# Patient Record
Sex: FEMALE | Race: BLACK OR AFRICAN AMERICAN | NOT HISPANIC OR LATINO | URBAN - METROPOLITAN AREA
[De-identification: names, ages, dates, MRNs, and addresses within clinical notes are randomized per-mention and may not be internally consistent; named-entity substitution may affect disease eponyms.]

---

## 2023-01-12 ENCOUNTER — EMERGENCY (EMERGENCY)
Facility: HOSPITAL | Age: 68
LOS: 1 days | Discharge: HOME | End: 2023-01-12
Attending: EMERGENCY MEDICINE | Admitting: EMERGENCY MEDICINE
Payer: MEDICARE

## 2023-01-12 VITALS
OXYGEN SATURATION: 98 % | TEMPERATURE: 98 F | SYSTOLIC BLOOD PRESSURE: 122 MMHG | RESPIRATION RATE: 16 BRPM | DIASTOLIC BLOOD PRESSURE: 72 MMHG | HEART RATE: 87 BPM

## 2023-01-12 PROCEDURE — 73562 X-RAY EXAM OF KNEE 3: CPT | Mod: 26,LT

## 2023-01-12 PROCEDURE — 73552 X-RAY EXAM OF FEMUR 2/>: CPT | Mod: 26,LT

## 2023-01-12 PROCEDURE — 73590 X-RAY EXAM OF LOWER LEG: CPT | Mod: 26,LT

## 2023-01-12 PROCEDURE — 99285 EMERGENCY DEPT VISIT HI MDM: CPT

## 2023-01-12 NOTE — ED PROVIDER NOTE - CARE PROVIDER_API CALL
Miller Villalobos  Orthopedic Surgery  [address]  [place] [address]  Phone: [phone]  Fax: [phone]  Scheduled Appointment: 01/19/2023

## 2023-01-12 NOTE — ED ADULT NURSE NOTE - OBJECTIVE STATEMENT
Pt presents to ED ambulatory with steady gait c/o left knee pain x3 weesk after a trip and fall. States the incident occurred while she was abroad and had imaging that showed a patellar fxr. She was going to have surgery there but decided to come [place] the [place] due to [place]. Denies any other medical complaints.

## 2023-01-12 NOTE — ED PROVIDER NOTE - PHYSICAL EXAMINATION
Gen: NAD, non-toxic appearing  Head: normal appearing  HEENT: normal conjunctiva  Lung: no respiratory distress, speaking in full sentences     CV:   DP and PT pulses intact in the   Left lower extremity.  MSK:   There is swelling surrounding the patella.  There is no open wounds.  There is palpable crepitus of the patella.  Mild femoral condyle tenderness, medially.  No point tenderness over the tibial or fibular shaft.  Neuro:   Intact sensation over the distribution of the sural, saphenous and superficial and deep peroneal nerves.  Intact sensation over the tibial nerve.  Gross motor function of the ankle joint is intact.  Skin: No lindy rashes

## 2023-01-12 NOTE — ED ADULT TRIAGE NOTE - BP NONINVASIVE SYSTOLIC (MM HG)
What Type Of Note Output Would You Prefer (Optional)?: Standard Output
How Severe Is Your Rash?: mild
Is This A New Presentation, Or A Follow-Up?: Rash
122

## 2023-01-12 NOTE — CONSULT NOTE ADULT - SUBJECTIVE AND OBJECTIVE BOX
HPI  67yFemale c/o L knee pain s/p mechanical fall twice 3 weeks ago while in [facility]. Able to bear weight in the LLE since the fall. Denies headstrike or LOC. Denies numbness/tingling in the LLE. Denies any other trauma/injuries at this time. At baseline, community ambulator w/o assistive devices. Per pt, wearing a L hard-soled shoe (that has Velcro straps) purely for convenience because she is unable to bend down and tie her shoes while in the L knee brace. Denied multiple times that she endured any trauma/injuries/pain to the L foot/ankle.    ROS  Negative unless otherwise specified in HPI.    PAST MEDICAL & SURGICAL Hx  PAST MEDICAL & SURGICAL HISTORY:    MEDICATIONS  Home Medications    ALLERGIES  No Known Allergies      VITALS  Vital Signs Last 24 Hrs  T(C): 36.4 (12 Jan 2023 14:31), Max: 36.4 (12 Jan 2023 14:31)  T(F): 97.6 (12 Jan 2023 14:31), Max: 97.6 (12 Jan 2023 14:31)  HR: 87 (12 Jan 2023 14:31) (87 - 87)  BP: 122/72 (12 Jan 2023 14:31) (122/72 - 122/72)  RR: 16 (12 Jan 2023 14:31) (16 - 16)  SpO2: 98% (12 Jan 2023 14:31) (98% - 98%)  Parameters below as of 12 Jan 2023 14:31  Patient On (Oxygen Delivery Method): room air    PHYSICAL EXAM  Gen: Lying in bed, NAD  Resp: No increased WOB  LLE:  Skin intact, +edema over L knee  +TTP over L knee, no TTP along remainder of extremity (including foot/ankle); compartments soft  Limited ROM of knee 2/2 pain, full painless ROM of foot/ankle  Unable to SLR  Motor: TA/EHL/GS/FHL intact  Sensory: DP/SP/Tib/Tami/Saph SILT  +DP pulse, WWP    Secondary survey:  No TTP along spine or other extremities, pelvis grossly stable, SILT and compartments soft throughout    IMAGING  XRs: L patella fx (personal read)    ASSESSMENT & PLAN  67yFemale w/ L patella fx s/p immobilization.  -WBAT LLE in a BJKI  -pain control  -ice/cold compress, elevation  -no acute ortho surgery at this time  -f/u outpt with Dr. Villalobos within 1 week, call office for appt HPI  67yFemale c/o L knee pain s/p mechanical fall twice 3 weeks ago while in [facility] [place]. Able to bear weight in the LLE since the fall. Denies headstrike or LOC. Denies numbness/tingling in the LLE. Denies any other trauma/injuries at this time. At baseline, community ambulator w/o assistive devices. Per pt, wearing a L hard-soled shoe (that has Velcro straps) purely for convenience because she is unable to bend down and tie her shoes while in the L knee brace. Denied multiple times that she endured any trauma/injuries/pain to the L foot/ankle.    ROS  Negative unless otherwise specified in HPI.    PAST MEDICAL & SURGICAL Hx  PAST MEDICAL & SURGICAL HISTORY:    MEDICATIONS  Home Medications    ALLERGIES  No Known Allergies      VITALS  Vital Signs Last 24 Hrs  T(C): 36.4 (12 Jan 2023 14:31), Max: 36.4 (12 Jan 2023 14:31)  T(F): 97.6 (12 Jan 2023 14:31), Max: 97.6 (12 Jan 2023 14:31)  HR: 87 (12 Jan 2023 14:31) (87 - 87)  BP: 122/72 (12 Jan 2023 14:31) (122/72 - 122/72)  RR: 16 (12 Jan 2023 14:31) (16 - 16)  SpO2: 98% (12 Jan 2023 14:31) (98% - 98%)  Parameters below as of 12 Jan 2023 14:31  Patient On (Oxygen Delivery Method): room air    PHYSICAL EXAM  Gen: Lying in bed, NAD  Resp: No increased WOB  LLE:  Skin intact, +edema over L knee  +TTP over L knee, no TTP along remainder of extremity (including foot/ankle); compartments soft  Limited ROM of knee 2/2 pain, full painless ROM of foot/ankle  Unable to SLR  Motor: TA/EHL/GS/FHL intact  Sensory: DP/SP/Tib/Tami/Saph SILT  +DP pulse, WWP    Secondary survey:  No TTP along spine or other extremities, pelvis grossly stable, SILT and compartments soft throughout    IMAGING  XRs: L patella fx (personal read)    ASSESSMENT & PLAN  67yFemale w/ L patella fx s/p immobilization.  -WBAT LLE in a BJKI  -pain control  -ice/cold compress, elevation  -no acute ortho surgery at this time  -f/u outpt with Dr. Villalobos within 1 week, call office for appt (also discussed with Dr. Salgado trauma specialist for outpatient office visit and surgery scheduling).

## 2023-01-12 NOTE — ED ADULT NURSE NOTE - CHIEF COMPLAINT QUOTE
patient states " I slip and fell on december 26 in [facility] and broke Patella in 2 places and I was recommended surgery " patient returned yesterday and came to ER today to get treated.

## 2023-01-12 NOTE — ED PROVIDER NOTE - NSFOLLOWUPINSTRUCTIONS_ED_ALL_ED_FT
Please make an appointment and follow up with ortho in 1 week. Take 600mg of ibuprofen (Motrin, Advil) with food every 8 hours for 3 days to reduce inflammation and treat your pain.  After that, you may take as needed - please follow the directions on the packaging.  Standard regular strength Motrin or Advil is 200mg, therefore take 3-4 pills per dose. Return to the ER for worsening or persistent symptoms, and/or ANY NEW OR CONCERNING SYMPTOMS. If you have issues obtaining follow up, please call: [phone] to obtain a doctor or specialist who takes your insurance in your area.  You may call [phone] to make an appointment with the internal medicine clinic.

## 2023-01-12 NOTE — ED PROVIDER NOTE - ATTENDING CONTRIBUTION TO CARE
DR. SINHA, ATTENDING MD-  I performed a face to face bedside interview with the patient regarding history of present illness, review of symptoms and past medical history. I completed an independent physical exam.  I have discussed the patient's plan of care with the resident.   Documentation as above in the note.    66 y/o female with l knee pain.  Had patellar fx dx 3 wks ago in [facility].  Was told she would need surgery but [place] to have [place] for financial reasons.  Denies numbness tingling weakness.  Pain currently well controlled.  Obtain xr's, consult ortho to arrange for further care.

## 2023-01-12 NOTE — ED PROVIDER NOTE - PATIENT PORTAL LINK FT
You can access the FollowMyHealth Patient Portal offered by [facility] by registering at the following website: [email or web address]. By joining [facility]’s FollowMyHealth portal, you will also be able to view your health information using other applications (apps) compatible with our system.

## 2023-01-12 NOTE — ED PROVIDER NOTE - OBJECTIVE STATEMENT
67-year-old female, without no medical issues, presenting with a chief complaint of left knee pain.  Started 3 weeks ago after she tripped and fell.  She was in [facility] at the time, she was seen at a local hospital.  x-rays and MRI done there demonstrated a patellar fracture.  She was due to have surgery in [facility] until she realized that the cost would be too high for her to afford, prompting her to come to the stage for medical care.  She denies any other injuries, fall was mechanical, no associated head or neck trauma, no additional complaints at this time.  No allergies to medications.  No regular medications.

## 2023-01-12 NOTE — ED PROVIDER NOTE - NS ED ROS FT
GENERAL: no fever  EYES: no eye pain  HEENT: no neck pain  CARDIAC: no chest pain  PULMONARY: no SOB  GI: no abdominal pain  : no dysuria  SKIN: no rashes  NEURO: no headache  MSK: + L knee pain

## 2023-01-12 NOTE — ED ADULT TRIAGE NOTE - CHIEF COMPLAINT QUOTE
patient states " I slip and fell on december 26 in [facility] and broke Patella in 2 places and I was recommended surgery " patient returned yesterday and came to ER today to get treated.

## 2023-01-12 NOTE — ED PROVIDER NOTE - CLINICAL SUMMARY MEDICAL DECISION MAKING FREE TEXT BOX
67-year-old female presenting with right knee pain and confirmed outside work-up for patellar fracture, operative.  Vital signs unremarkable.  No hard signs of neurogenic or vascular injury at this time.  Will repeat plain films to assess for fracture.  Further work-up and consultation as a function of imaging findings.

## 2023-01-13 PROBLEM — Z00.00 ENCOUNTER FOR PREVENTIVE HEALTH EXAMINATION: Status: ACTIVE | Noted: 2023-01-13

## 2023-01-17 ENCOUNTER — APPOINTMENT (OUTPATIENT)
Dept: ORTHOPEDIC SURGERY | Facility: CLINIC | Age: 68
End: 2023-01-17

## 2023-01-18 ENCOUNTER — APPOINTMENT (OUTPATIENT)
Dept: ORTHOPEDIC SURGERY | Facility: CLINIC | Age: 68
End: 2023-01-18
Payer: COMMERCIAL

## 2023-01-18 DIAGNOSIS — S82.092A OTHER FRACTURE OF LEFT PATELLA, INITIAL ENCOUNTER FOR CLOSED FRACTURE: ICD-10-CM

## 2023-01-18 PROCEDURE — 99203 OFFICE O/P NEW LOW 30 MIN: CPT

## 2023-01-18 NOTE — PHYSICAL EXAM
[de-identified] : The patient is sitting comfortably in the exam room. \par LEFT leg.\par -Skin is intact, mild swelling, mild ecchymosis\par -Pain with range of motion\par -Unable to do a straight leg raise\par -Palpable defect\par -Sensation is intact L1-S1\par -5/5 EHL, FHL, TA, GS\par -Foot is warm and well-perfused, palpable dorsalis pedis pulse\par  [de-identified] : Xrays from [facility] show Comminuted fracture of the lower pole of the patella is again seen with approximately 3 cm of distraction 1/12/23.

## 2023-01-18 NOTE — DISCUSSION/SUMMARY
[de-identified] : 67-year-old with left patella fracture, approximately 5 days out.\par \par -The risks and benefits of operative versus nonoperative management were discussed at length with the patient. The patient shows a good understanding of the injury and treatment options. They would like to move forward with operative management.  I explained that the surgery will be more difficult due to the fact that the injury is approximately 3 weeks out already.\par -Weightbearing as tolerated with knee immobilizer.\par -Tylenol and NSAIDs for pain\par -We will schedule the patient for open reduction internal fixation of the left patella\par -All of the patient's questions and concerns were addressed.\par

## 2023-01-18 NOTE — HISTORY OF PRESENT ILLNESS
[de-identified] : Mrs. SEVERIN, DONNA is a 67 y.o. RHD woman who presents to the office with a left patella fracture sustained 12/26/22 in [facility] and was seen in [facility] on 1/12/23. She is here for further evaluation and discussion of treatment options. Currently taking Advil prn for pain.  She denies any medical problems and denies taking any medications.

## 2023-01-19 ENCOUNTER — OUTPATIENT (OUTPATIENT)
Dept: OUTPATIENT SERVICES | Facility: HOSPITAL | Age: 68
LOS: 1 days | End: 2023-01-19
Payer: COMMERCIAL

## 2023-01-19 VITALS
TEMPERATURE: 98 F | RESPIRATION RATE: 20 BRPM | SYSTOLIC BLOOD PRESSURE: 111 MMHG | DIASTOLIC BLOOD PRESSURE: 76 MMHG | WEIGHT: 166.89 LBS | HEIGHT: 63 IN | OXYGEN SATURATION: 99 % | HEART RATE: 82 BPM

## 2023-01-19 DIAGNOSIS — Z98.890 OTHER SPECIFIED POSTPROCEDURAL STATES: Chronic | ICD-10-CM

## 2023-01-19 DIAGNOSIS — S82.092A OTHER FRACTURE OF LEFT PATELLA, INITIAL ENCOUNTER FOR CLOSED FRACTURE: ICD-10-CM

## 2023-01-19 DIAGNOSIS — Z11.52 ENCOUNTER FOR SCREENING FOR COVID-19: ICD-10-CM

## 2023-01-19 DIAGNOSIS — Z01.818 ENCOUNTER FOR OTHER PREPROCEDURAL EXAMINATION: ICD-10-CM

## 2023-01-19 LAB
ANION GAP SERPL CALC-SCNC: 14 MMOL/L — SIGNIFICANT CHANGE UP (ref 5–17)
BUN SERPL-MCNC: 13 MG/DL — SIGNIFICANT CHANGE UP (ref 7–23)
CALCIUM SERPL-MCNC: 10.3 MG/DL — SIGNIFICANT CHANGE UP (ref 8.4–10.5)
CHLORIDE SERPL-SCNC: 105 MMOL/L — SIGNIFICANT CHANGE UP (ref 96–108)
CO2 SERPL-SCNC: 20 MMOL/L — LOW (ref 22–31)
CREAT SERPL-MCNC: 0.76 MG/DL — SIGNIFICANT CHANGE UP (ref 0.5–1.3)
EGFR: 86 ML/MIN/1.73M2 — SIGNIFICANT CHANGE UP
GLUCOSE SERPL-MCNC: 81 MG/DL — SIGNIFICANT CHANGE UP (ref 70–99)
HCT VFR BLD CALC: 41 % — SIGNIFICANT CHANGE UP (ref 34.5–45)
HGB BLD-MCNC: 12.8 G/DL — SIGNIFICANT CHANGE UP (ref 11.5–15.5)
MCHC RBC-ENTMCNC: 29 PG — SIGNIFICANT CHANGE UP (ref 27–34)
MCHC RBC-ENTMCNC: 31.2 GM/DL — LOW (ref 32–36)
MCV RBC AUTO: 93 FL — SIGNIFICANT CHANGE UP (ref 80–100)
NRBC # BLD: 0 /100 WBCS — SIGNIFICANT CHANGE UP (ref 0–0)
PLATELET # BLD AUTO: 240 K/UL — SIGNIFICANT CHANGE UP (ref 150–400)
POTASSIUM SERPL-MCNC: 5.1 MMOL/L — SIGNIFICANT CHANGE UP (ref 3.5–5.3)
POTASSIUM SERPL-SCNC: 5.1 MMOL/L — SIGNIFICANT CHANGE UP (ref 3.5–5.3)
RBC # BLD: 4.41 M/UL — SIGNIFICANT CHANGE UP (ref 3.8–5.2)
RBC # FLD: 13.7 % — SIGNIFICANT CHANGE UP (ref 10.3–14.5)
SARS-COV-2 RNA SPEC QL NAA+PROBE: SIGNIFICANT CHANGE UP
SODIUM SERPL-SCNC: 139 MMOL/L — SIGNIFICANT CHANGE UP (ref 135–145)
WBC # BLD: 3.99 K/UL — SIGNIFICANT CHANGE UP (ref 3.8–10.5)
WBC # FLD AUTO: 3.99 K/UL — SIGNIFICANT CHANGE UP (ref 3.8–10.5)

## 2023-01-19 PROCEDURE — 85027 COMPLETE CBC AUTOMATED: CPT

## 2023-01-19 PROCEDURE — G0463: CPT

## 2023-01-19 PROCEDURE — 80048 BASIC METABOLIC PNL TOTAL CA: CPT

## 2023-01-19 RX ORDER — LIDOCAINE HCL 20 MG/ML
0.2 VIAL (ML) INJECTION ONCE
Refills: 0 | Status: DISCONTINUED | OUTPATIENT
Start: 2023-01-23 | End: 2023-01-23

## 2023-01-19 RX ORDER — CHLORHEXIDINE GLUCONATE 213 G/1000ML
1 SOLUTION TOPICAL ONCE
Refills: 0 | Status: DISCONTINUED | OUTPATIENT
Start: 2023-01-23 | End: 2023-01-23

## 2023-01-19 RX ORDER — SODIUM CHLORIDE 9 MG/ML
3 INJECTION INTRAMUSCULAR; INTRAVENOUS; SUBCUTANEOUS EVERY 8 HOURS
Refills: 0 | Status: DISCONTINUED | OUTPATIENT
Start: 2023-01-23 | End: 2023-01-23

## 2023-01-19 NOTE — H&P PST ADULT - HEIGHT IN INCHES
Rapid Response Team Follow-up Note    Followed up with patient for proactive rounding.   Continues to have high fevers, use of PRN Tylenol done.  Reviewed plan of care with primary RN  Please call Rapid Response RN with any questions or concerns at  [phone].   3

## 2023-01-19 NOTE — H&P PST ADULT - ASSESSMENT
DASI score: 6  DASI activity: usually active, no formal exercise, limited now because of leg brace  Loose teeth or denture:none

## 2023-01-19 NOTE — H&P PST ADULT - ATTENDING COMMENTS
Associated images, notes, and labs were reviewed. The patient was seen and examined. Risks and benefits of operative versus nonoperative management were discussed with the patient. The patient showed a good understanding of the injury and the treatment options. They would like to move forward with operative management of patella fracture.

## 2023-01-19 NOTE — H&P PST ADULT - HISTORY OF PRESENT ILLNESS
68 yo female with fracture of left patella in [facility] 12/22, upon return patient seen in ER, diagnosed with patella fracture, now for repair 66 yo female with fracture of left patella in [facility] 12/22, upon return patient seen in ER, diagnosed with patella fracture, now for repair. Wearing immobilizer at visit.  pain 4/10, able to sleep without pain    Denies covid or covid exposure  covid swab 1/19 [place]

## 2023-01-22 ENCOUNTER — TRANSCRIPTION ENCOUNTER (OUTPATIENT)
Age: 68
End: 2023-01-22

## 2023-01-23 ENCOUNTER — TRANSCRIPTION ENCOUNTER (OUTPATIENT)
Age: 68
End: 2023-01-23

## 2023-01-23 ENCOUNTER — OUTPATIENT (OUTPATIENT)
Dept: OUTPATIENT SERVICES | Facility: HOSPITAL | Age: 68
LOS: 1 days | End: 2023-01-23
Payer: COMMERCIAL

## 2023-01-23 ENCOUNTER — APPOINTMENT (OUTPATIENT)
Dept: ORTHOPEDIC SURGERY | Facility: HOSPITAL | Age: 68
End: 2023-01-23

## 2023-01-23 VITALS
HEIGHT: 63 IN | HEART RATE: 76 BPM | SYSTOLIC BLOOD PRESSURE: 128 MMHG | OXYGEN SATURATION: 96 % | DIASTOLIC BLOOD PRESSURE: 76 MMHG | TEMPERATURE: 98 F | RESPIRATION RATE: 18 BRPM | WEIGHT: 166.89 LBS

## 2023-01-23 DIAGNOSIS — Z98.890 OTHER SPECIFIED POSTPROCEDURAL STATES: Chronic | ICD-10-CM

## 2023-01-23 DIAGNOSIS — S82.092A OTHER FRACTURE OF LEFT PATELLA, INITIAL ENCOUNTER FOR CLOSED FRACTURE: ICD-10-CM

## 2023-01-23 PROCEDURE — 76000 FLUOROSCOPY <1 HR PHYS/QHP: CPT

## 2023-01-23 PROCEDURE — C9803: CPT

## 2023-01-23 PROCEDURE — 27524 TREAT KNEECAP FRACTURE: CPT | Mod: LT

## 2023-01-23 PROCEDURE — U0005: CPT

## 2023-01-23 PROCEDURE — U0003: CPT

## 2023-01-23 DEVICE — SCREW LOKG 2.7X16MM: Type: IMPLANTABLE DEVICE | Site: LEFT | Status: FUNCTIONAL

## 2023-01-23 DEVICE — GUIDEWIRE UNTHREADED 1.4X150MM: Type: IMPLANTABLE DEVICE | Site: LEFT | Status: FUNCTIONAL

## 2023-01-23 DEVICE — SCREW LOKG 2.7 X 18: Type: IMPLANTABLE DEVICE | Site: LEFT | Status: FUNCTIONAL

## 2023-01-23 DEVICE — PLATE T VARIAX NRRW LOKG 6H 2.7X47MM: Type: IMPLANTABLE DEVICE | Site: LEFT | Status: FUNCTIONAL

## 2023-01-23 DEVICE — WASHER TI 4MM: Type: IMPLANTABLE DEVICE | Site: LEFT | Status: FUNCTIONAL

## 2023-01-23 DEVICE — SCREW NON LOCKING 2.7X16MM: Type: IMPLANTABLE DEVICE | Site: LEFT | Status: FUNCTIONAL

## 2023-01-23 DEVICE — SCREW LOKG 2.7X12MM: Type: IMPLANTABLE DEVICE | Site: LEFT | Status: FUNCTIONAL

## 2023-01-23 DEVICE — SCREW CANN TI 4X34MM: Type: IMPLANTABLE DEVICE | Site: LEFT | Status: FUNCTIONAL

## 2023-01-23 DEVICE — PLATE VARIAX NRRW LOKG 6H 2.4X41MM: Type: IMPLANTABLE DEVICE | Site: LEFT | Status: FUNCTIONAL

## 2023-01-23 DEVICE — SCREW BONE 2.7X40MM: Type: IMPLANTABLE DEVICE | Site: LEFT | Status: FUNCTIONAL

## 2023-01-23 RX ORDER — ACETAMINOPHEN 500 MG
1000 TABLET ORAL ONCE
Refills: 0 | Status: COMPLETED | OUTPATIENT
Start: 2023-01-23 | End: 2023-01-23

## 2023-01-23 RX ORDER — SODIUM CHLORIDE 9 MG/ML
500 INJECTION INTRAMUSCULAR; INTRAVENOUS; SUBCUTANEOUS ONCE
Refills: 0 | Status: COMPLETED | OUTPATIENT
Start: 2023-01-24 | End: 2023-01-24

## 2023-01-23 RX ORDER — OXYCODONE HYDROCHLORIDE 5 MG/1
10 TABLET ORAL EVERY 4 HOURS
Refills: 0 | Status: DISCONTINUED | OUTPATIENT
Start: 2023-01-23 | End: 2023-01-24

## 2023-01-23 RX ORDER — OXYCODONE HYDROCHLORIDE 5 MG/1
1 TABLET ORAL
Qty: 15 | Refills: 0
Start: 2023-01-23 | End: 2023-01-27

## 2023-01-23 RX ORDER — ASPIRIN/CALCIUM CARB/MAGNESIUM 324 MG
325 TABLET ORAL EVERY 12 HOURS
Refills: 0 | Status: DISCONTINUED | OUTPATIENT
Start: 2023-01-23 | End: 2023-02-06

## 2023-01-23 RX ORDER — ACETAMINOPHEN 500 MG
975 TABLET ORAL EVERY 8 HOURS
Refills: 0 | Status: DISCONTINUED | OUTPATIENT
Start: 2023-01-24 | End: 2023-02-06

## 2023-01-23 RX ORDER — FAMOTIDINE 10 MG/ML
20 INJECTION INTRAVENOUS
Refills: 0 | Status: DISCONTINUED | OUTPATIENT
Start: 2023-01-23 | End: 2023-02-06

## 2023-01-23 RX ORDER — SODIUM CHLORIDE 9 MG/ML
1000 INJECTION INTRAMUSCULAR; INTRAVENOUS; SUBCUTANEOUS
Refills: 0 | Status: DISCONTINUED | OUTPATIENT
Start: 2023-01-23 | End: 2023-02-06

## 2023-01-23 RX ORDER — OXYCODONE HYDROCHLORIDE 5 MG/1
5 TABLET ORAL ONCE
Refills: 0 | Status: DISCONTINUED | OUTPATIENT
Start: 2023-01-23 | End: 2023-01-23

## 2023-01-23 RX ORDER — FAMOTIDINE 10 MG/ML
20 INJECTION INTRAVENOUS ONCE
Refills: 0 | Status: COMPLETED | OUTPATIENT
Start: 2023-01-23 | End: 2023-01-23

## 2023-01-23 RX ORDER — HYDROMORPHONE HYDROCHLORIDE 2 MG/ML
0.5 INJECTION INTRAMUSCULAR; INTRAVENOUS; SUBCUTANEOUS
Refills: 0 | Status: DISCONTINUED | OUTPATIENT
Start: 2023-01-23 | End: 2023-01-24

## 2023-01-23 RX ORDER — DIPHENHYDRAMINE HCL 50 MG
12.5 CAPSULE ORAL ONCE
Refills: 0 | Status: COMPLETED | OUTPATIENT
Start: 2023-01-23 | End: 2023-01-23

## 2023-01-23 RX ORDER — CEFAZOLIN SODIUM 1 G
2000 VIAL (EA) INJECTION EVERY 6 HOURS
Refills: 0 | Status: COMPLETED | OUTPATIENT
Start: 2023-01-23 | End: 2023-01-24

## 2023-01-23 RX ORDER — ASPIRIN/CALCIUM CARB/MAGNESIUM 324 MG
1 TABLET ORAL
Qty: 60 | Refills: 0
Start: 2023-01-23 | End: 2023-02-21

## 2023-01-23 RX ORDER — DIPHENHYDRAMINE HCL 50 MG
12.5 CAPSULE ORAL ONCE
Refills: 0 | Status: DISCONTINUED | OUTPATIENT
Start: 2023-01-23 | End: 2023-01-23

## 2023-01-23 RX ORDER — ONDANSETRON 8 MG/1
4 TABLET, FILM COATED ORAL ONCE
Refills: 0 | Status: COMPLETED | OUTPATIENT
Start: 2023-01-23 | End: 2023-01-23

## 2023-01-23 RX ORDER — CEFAZOLIN SODIUM 1 G
2000 VIAL (EA) INJECTION ONCE
Refills: 0 | Status: COMPLETED | OUTPATIENT
Start: 2023-01-24 | End: 2023-01-24

## 2023-01-23 RX ORDER — CEFAZOLIN SODIUM 1 G
2000 VIAL (EA) INJECTION EVERY 8 HOURS
Refills: 0 | Status: DISCONTINUED | OUTPATIENT
Start: 2023-01-23 | End: 2023-01-23

## 2023-01-23 RX ORDER — FENTANYL CITRATE 50 UG/ML
50 INJECTION INTRAVENOUS
Refills: 0 | Status: DISCONTINUED | OUTPATIENT
Start: 2023-01-23 | End: 2023-01-24

## 2023-01-23 RX ORDER — ONDANSETRON 8 MG/1
4 TABLET, FILM COATED ORAL EVERY 6 HOURS
Refills: 0 | Status: DISCONTINUED | OUTPATIENT
Start: 2023-01-23 | End: 2023-02-06

## 2023-01-23 RX ORDER — ACETAMINOPHEN 500 MG
3 TABLET ORAL
Qty: 0 | Refills: 0 | DISCHARGE
Start: 2023-01-23

## 2023-01-23 RX ORDER — CEFAZOLIN SODIUM 1 G
2000 VIAL (EA) INJECTION ONCE
Refills: 0 | Status: COMPLETED | OUTPATIENT
Start: 2023-01-23 | End: 2023-01-23

## 2023-01-23 RX ORDER — LANOLIN ALCOHOL/MO/W.PET/CERES
5 CREAM (GRAM) TOPICAL AT BEDTIME
Refills: 0 | Status: DISCONTINUED | OUTPATIENT
Start: 2023-01-23 | End: 2023-02-06

## 2023-01-23 RX ORDER — FENTANYL CITRATE 50 UG/ML
25 INJECTION INTRAVENOUS
Refills: 0 | Status: DISCONTINUED | OUTPATIENT
Start: 2023-01-23 | End: 2023-01-24

## 2023-01-23 RX ORDER — HYDROMORPHONE HYDROCHLORIDE 2 MG/ML
0.5 INJECTION INTRAMUSCULAR; INTRAVENOUS; SUBCUTANEOUS ONCE
Refills: 0 | Status: DISCONTINUED | OUTPATIENT
Start: 2023-01-23 | End: 2023-01-23

## 2023-01-23 RX ORDER — SENNA PLUS 8.6 MG/1
2 TABLET ORAL AT BEDTIME
Refills: 0 | Status: DISCONTINUED | OUTPATIENT
Start: 2023-01-23 | End: 2023-02-06

## 2023-01-23 RX ORDER — OXYCODONE HYDROCHLORIDE 5 MG/1
5 TABLET ORAL EVERY 6 HOURS
Refills: 0 | Status: DISCONTINUED | OUTPATIENT
Start: 2023-01-23 | End: 2023-01-23

## 2023-01-23 RX ORDER — KETOROLAC TROMETHAMINE 30 MG/ML
15 SYRINGE (ML) INJECTION ONCE
Refills: 0 | Status: DISCONTINUED | OUTPATIENT
Start: 2023-01-23 | End: 2023-01-23

## 2023-01-23 RX ORDER — ONDANSETRON 8 MG/1
4 TABLET, FILM COATED ORAL ONCE
Refills: 0 | Status: DISCONTINUED | OUTPATIENT
Start: 2023-01-23 | End: 2023-01-24

## 2023-01-23 RX ORDER — OXYCODONE HYDROCHLORIDE 5 MG/1
5 TABLET ORAL EVERY 4 HOURS
Refills: 0 | Status: DISCONTINUED | OUTPATIENT
Start: 2023-01-23 | End: 2023-01-23

## 2023-01-23 RX ORDER — SENNA PLUS 8.6 MG/1
2 TABLET ORAL
Qty: 0 | Refills: 0 | DISCHARGE
Start: 2023-01-23

## 2023-01-23 RX ADMIN — SODIUM CHLORIDE 100 MILLILITER(S): 9 INJECTION INTRAMUSCULAR; INTRAVENOUS; SUBCUTANEOUS at 22:19

## 2023-01-23 RX ADMIN — HYDROMORPHONE HYDROCHLORIDE 0.5 MILLIGRAM(S): 2 INJECTION INTRAMUSCULAR; INTRAVENOUS; SUBCUTANEOUS at 17:45

## 2023-01-23 RX ADMIN — OXYCODONE HYDROCHLORIDE 10 MILLIGRAM(S): 5 TABLET ORAL at 23:02

## 2023-01-23 RX ADMIN — HYDROMORPHONE HYDROCHLORIDE 0.5 MILLIGRAM(S): 2 INJECTION INTRAMUSCULAR; INTRAVENOUS; SUBCUTANEOUS at 16:56

## 2023-01-23 RX ADMIN — Medication 100 MILLIGRAM(S): at 20:31

## 2023-01-23 RX ADMIN — SODIUM CHLORIDE 100 MILLILITER(S): 9 INJECTION INTRAMUSCULAR; INTRAVENOUS; SUBCUTANEOUS at 20:31

## 2023-01-23 RX ADMIN — Medication 325 MILLIGRAM(S): at 22:17

## 2023-01-23 RX ADMIN — Medication 15 MILLIGRAM(S): at 23:48

## 2023-01-23 RX ADMIN — HYDROMORPHONE HYDROCHLORIDE 0.5 MILLIGRAM(S): 2 INJECTION INTRAMUSCULAR; INTRAVENOUS; SUBCUTANEOUS at 17:34

## 2023-01-23 RX ADMIN — ONDANSETRON 4 MILLIGRAM(S): 8 TABLET, FILM COATED ORAL at 18:00

## 2023-01-23 RX ADMIN — Medication 1000 MILLIGRAM(S): at 22:00

## 2023-01-23 RX ADMIN — Medication 400 MILLIGRAM(S): at 21:03

## 2023-01-23 RX ADMIN — Medication 12.5 MILLIGRAM(S): at 19:30

## 2023-01-23 RX ADMIN — OXYCODONE HYDROCHLORIDE 5 MILLIGRAM(S): 5 TABLET ORAL at 18:15

## 2023-01-23 RX ADMIN — OXYCODONE HYDROCHLORIDE 10 MILLIGRAM(S): 5 TABLET ORAL at 22:18

## 2023-01-23 RX ADMIN — FAMOTIDINE 20 MILLIGRAM(S): 10 INJECTION INTRAVENOUS at 19:24

## 2023-01-23 RX ADMIN — OXYCODONE HYDROCHLORIDE 5 MILLIGRAM(S): 5 TABLET ORAL at 17:45

## 2023-01-23 NOTE — PHYSICAL THERAPY INITIAL EVALUATION ADULT - PERTINENT HX OF CURRENT PROBLEM, REHAB EVAL
66 yo female with fracture of left patella in [facility] 12/22, upon return patient seen in ER, diagnosed with patella fracture, now for repair. Wearing immobilizer at visit. 66 yo female with fracture of left patella in [facility] 12/22, upon return patient seen in ER, diagnosed with patella fracture, now for repair. Wearing immobilizer at visit. Now s/p L patella ORIF 1/23.

## 2023-01-23 NOTE — ASU DISCHARGE PLAN (ADULT/PEDIATRIC) - CARE PROVIDER_API CALL
Jovan Salgado)  Orthopedics  [address] [place] [address]  Phone: [phone]  Fax: [phone]  Follow Up Time:

## 2023-01-23 NOTE — PRE-ANESTHESIA EVALUATION ADULT - BP NONINVASIVE SYSTOLIC (MM HG)
Partially impaired: cannot see medication labels or newsprint, but can see obstacles in path, and the surrounding layout; can count fingers at arm's length 128

## 2023-01-23 NOTE — PHYSICAL THERAPY INITIAL EVALUATION ADULT - GAIT DEVIATIONS NOTED, PT EVAL
decreased jonny/increased time in double stance/decreased step length/decreased stride length/decreased weight-shifting ability

## 2023-01-23 NOTE — PHYSICAL THERAPY INITIAL EVALUATION ADULT - GENERAL OBSERVATIONS, REHAB EVAL
Pt rec'd semisupine in bed, c/o 4/10 L knee pain, and c/o of nauseousness, +IVL, +PACU monitoring. Agreeable to attempt PT. RN cleared pt to be seen and present t/o.

## 2023-01-23 NOTE — ASU PREOP CHECKLIST - HAND OFF
A/P: 36yo  POD#1 s/p rLTCS and BLS. Patient is stable and doing well post-operatively. Unit RN to OR RN

## 2023-01-23 NOTE — PHYSICAL THERAPY INITIAL EVALUATION ADULT - ADDITIONAL COMMENTS
Per pt, she will be staying at her niece's house upon d/c. Reports he cousin will be staying with pt to assist as needed. Reports niece's house has 13 steps to enter (+HR) and 6 steps inside (+HR) to bedroom. Pt reports she was independent with functional mobility using crutches since fall in December. However, prior to fall, pt was independent without AD.

## 2023-01-23 NOTE — PHYSICAL THERAPY INITIAL EVALUATION ADULT - ACTIVE RANGE OF MOTION EXAMINATION, REHAB EVAL
L ankle and hip AAROM/PROM WLF (unable to L SLR 2/2 pain)/bilateral upper extremity Active ROM was WFL (within functional limits)/Right LE Active ROM was WFL (within functional limits)

## 2023-01-23 NOTE — ASU DISCHARGE PLAN (ADULT/PEDIATRIC) - ASU DC SPECIAL INSTRUCTIONSFT
Follow up with Dr Perales 10-14 days. Call office for appointment. Take medications as prescribed. Keep dressing clean, dry, and intact. Rest, ice, and elevate affected extremity.    Keep leg in extension at all times

## 2023-01-24 VITALS
RESPIRATION RATE: 15 BRPM | SYSTOLIC BLOOD PRESSURE: 100 MMHG | OXYGEN SATURATION: 100 % | DIASTOLIC BLOOD PRESSURE: 59 MMHG | HEART RATE: 60 BPM

## 2023-01-24 LAB
ANION GAP SERPL CALC-SCNC: 12 MMOL/L — SIGNIFICANT CHANGE UP (ref 5–17)
BUN SERPL-MCNC: 15 MG/DL — SIGNIFICANT CHANGE UP (ref 7–23)
CALCIUM SERPL-MCNC: 9 MG/DL — SIGNIFICANT CHANGE UP (ref 8.4–10.5)
CHLORIDE SERPL-SCNC: 104 MMOL/L — SIGNIFICANT CHANGE UP (ref 96–108)
CO2 SERPL-SCNC: 22 MMOL/L — SIGNIFICANT CHANGE UP (ref 22–31)
CREAT SERPL-MCNC: 0.69 MG/DL — SIGNIFICANT CHANGE UP (ref 0.5–1.3)
EGFR: 96 ML/MIN/1.73M2 — SIGNIFICANT CHANGE UP
GLUCOSE SERPL-MCNC: 190 MG/DL — HIGH (ref 70–99)
HCT VFR BLD CALC: 35.2 % — SIGNIFICANT CHANGE UP (ref 34.5–45)
HGB BLD-MCNC: 10.9 G/DL — LOW (ref 11.5–15.5)
MCHC RBC-ENTMCNC: 28.8 PG — SIGNIFICANT CHANGE UP (ref 27–34)
MCHC RBC-ENTMCNC: 31 GM/DL — LOW (ref 32–36)
MCV RBC AUTO: 92.9 FL — SIGNIFICANT CHANGE UP (ref 80–100)
NRBC # BLD: 0 /100 WBCS — SIGNIFICANT CHANGE UP (ref 0–0)
PLATELET # BLD AUTO: 238 K/UL — SIGNIFICANT CHANGE UP (ref 150–400)
POTASSIUM SERPL-MCNC: 4.6 MMOL/L — SIGNIFICANT CHANGE UP (ref 3.5–5.3)
POTASSIUM SERPL-SCNC: 4.6 MMOL/L — SIGNIFICANT CHANGE UP (ref 3.5–5.3)
RBC # BLD: 3.79 M/UL — LOW (ref 3.8–5.2)
RBC # FLD: 13.6 % — SIGNIFICANT CHANGE UP (ref 10.3–14.5)
SODIUM SERPL-SCNC: 138 MMOL/L — SIGNIFICANT CHANGE UP (ref 135–145)
WBC # BLD: 5.6 K/UL — SIGNIFICANT CHANGE UP (ref 3.8–10.5)
WBC # FLD AUTO: 5.6 K/UL — SIGNIFICANT CHANGE UP (ref 3.8–10.5)

## 2023-01-24 PROCEDURE — C1889: CPT

## 2023-01-24 PROCEDURE — 80048 BASIC METABOLIC PNL TOTAL CA: CPT

## 2023-01-24 PROCEDURE — C1713: CPT

## 2023-01-24 PROCEDURE — 97530 THERAPEUTIC ACTIVITIES: CPT

## 2023-01-24 PROCEDURE — 97116 GAIT TRAINING THERAPY: CPT

## 2023-01-24 PROCEDURE — 76000 FLUOROSCOPY <1 HR PHYS/QHP: CPT

## 2023-01-24 PROCEDURE — 97161 PT EVAL LOW COMPLEX 20 MIN: CPT

## 2023-01-24 PROCEDURE — 27524 TREAT KNEECAP FRACTURE: CPT | Mod: LT

## 2023-01-24 PROCEDURE — C1769: CPT

## 2023-01-24 PROCEDURE — C9399: CPT

## 2023-01-24 PROCEDURE — 85027 COMPLETE CBC AUTOMATED: CPT

## 2023-01-24 RX ADMIN — SODIUM CHLORIDE 100 MILLILITER(S): 9 INJECTION INTRAMUSCULAR; INTRAVENOUS; SUBCUTANEOUS at 06:44

## 2023-01-24 RX ADMIN — Medication 15 MILLIGRAM(S): at 00:19

## 2023-01-24 RX ADMIN — SODIUM CHLORIDE 100 MILLILITER(S): 9 INJECTION INTRAMUSCULAR; INTRAVENOUS; SUBCUTANEOUS at 03:20

## 2023-01-24 RX ADMIN — SODIUM CHLORIDE 1000 MILLILITER(S): 9 INJECTION INTRAMUSCULAR; INTRAVENOUS; SUBCUTANEOUS at 06:45

## 2023-01-24 RX ADMIN — OXYCODONE HYDROCHLORIDE 10 MILLIGRAM(S): 5 TABLET ORAL at 03:22

## 2023-01-24 RX ADMIN — Medication 100 MILLIGRAM(S): at 03:17

## 2023-01-24 RX ADMIN — FAMOTIDINE 20 MILLIGRAM(S): 10 INJECTION INTRAVENOUS at 07:53

## 2023-01-24 RX ADMIN — OXYCODONE HYDROCHLORIDE 10 MILLIGRAM(S): 5 TABLET ORAL at 04:00

## 2023-01-24 NOTE — PROGRESS NOTE ADULT - SUBJECTIVE AND OBJECTIVE BOX
Orthopaedic Surgery Progress Note    Subjective:   Patient seen and examined. No acute events overnight. Nausea resolved, has ambulated to bathroom, eager to work with PT and go home    Objective:  T(C): 36.1 (01-24-23 @ 03:00), Max: 36.6 (01-23-23 @ 10:11)  HR: 61 (01-24-23 @ 04:00) (61 - 93)  BP: 99/53 (01-24-23 @ 04:00) (94/55 - 138/73)  RR: 17 (01-24-23 @ 04:00) (14 - 18)  SpO2: 100% (01-24-23 @ 04:00) (94% - 100%)  Wt(kg): --    01-23 @ 07:01  -  01-24 @ 05:53  --------------------------------------------------------  IN: 820 mL / OUT: 400 mL / NET: 420 mL        PE    NAD  LLE:   dressing C/D/I, KI in place  motor intact GS/TA/EHL  SILT S/S/SP/DP  WWP                          10.9   5.60  )-----------( 238      ( 24 Jan 2023 03:18 )             35.2         67y Female s/p ORIF L patella 1/23  - Pain control  - WBAT in KI  - PT/OT/OOB  - DVT ppx:  BID  - Dispo planning: home after works with PT this AM

## 2023-02-08 ENCOUNTER — APPOINTMENT (OUTPATIENT)
Dept: ORTHOPEDIC SURGERY | Facility: CLINIC | Age: 68
End: 2023-02-08
Payer: COMMERCIAL

## 2023-02-08 VITALS — HEIGHT: 63 IN | WEIGHT: 168 LBS | BODY MASS INDEX: 29.77 KG/M2

## 2023-02-08 PROCEDURE — 99024 POSTOP FOLLOW-UP VISIT: CPT

## 2023-02-09 NOTE — HISTORY OF PRESENT ILLNESS
[Chills] : no chills [Constipation] : no constipation [Diarrhea] : no diarrhea [Dysuria] : no dysuria [Fever] : no fever [Nausea] : no nausea [Vomiting] : no vomiting [de-identified] : s/p ORIF left patella, DOS: 1/23/23 [de-identified] : Mrs. SEVERIN, DONNA is a 67 y.o. woman who presents to the office for post op s/p ORIF left patella on 1/23/23. Since her surgery she states she is feeling good.  Mild pain across the incision site. She is in a knee immobilizer.  Ambulating with bilateral axillary crutches.   [de-identified] : The patient is sitting comfortably in the exam room. \par LEFT leg.\par -Skin is intact, no swelling, no ecchymosis\par -Incision is clean and dry, no erythema, no signs of infection\par -Range of motion knee 0 to 30 degrees\par -Sensation is intact L1-S1\par -5/5 EHL, FHL, TA, GS\par -Foot is warm and well-perfused, palpable dorsalis pedis pulse\par  [de-identified] : No Xrays were taken in the office today, 2/8/23. [de-identified] : 67-year-old woman s/p ORIF left patella, approximately 2 weeks out. [de-identified] : -Weightbearing as tolerated left LE\par -Jaquan brace provided locked in extension,allow 30 degrees flexion in 2 weeks: 2/22/23\par -Physical therapy: to improve ROM, prevent flexion for 2 more weeks: 2/22/23\par -Home exercises.  These were demonstrated in the office today.\par -Follow-up in 1 month with x-rays of the left knee at that time\par -All the patient's questions and concerns were addressed during this visit\par

## 2023-03-08 ENCOUNTER — APPOINTMENT (OUTPATIENT)
Dept: ORTHOPEDIC SURGERY | Facility: CLINIC | Age: 68
End: 2023-03-08
Payer: COMMERCIAL

## 2023-03-08 PROBLEM — Z78.9 OTHER SPECIFIED HEALTH STATUS: Chronic | Status: ACTIVE | Noted: 2023-01-19

## 2023-03-08 PROCEDURE — 99024 POSTOP FOLLOW-UP VISIT: CPT

## 2023-03-08 PROCEDURE — 73562 X-RAY EXAM OF KNEE 3: CPT | Mod: LT

## 2023-04-20 NOTE — HISTORY OF PRESENT ILLNESS
[Chills] : no chills [Constipation] : no constipation [Diarrhea] : no diarrhea [Dysuria] : no dysuria [Fever] : no fever [Nausea] : no nausea [Vomiting] : no vomiting [de-identified] : s/p ORIF left patella, DOS: 1/23/23 [de-identified] : Mrs. SEVERIN, DONNA is a 67 y.o. woman who presents to the office for post op s/p ORIF left patella on 1/23/23. Since her last visit she states she is feeling better. She presents in a Jaquan brace. She is participating in PT 2 x week. She has some stiffness at night. She does not take any medication for pain.  [de-identified] : The patient is sitting comfortably in the exam room. \par LEFT leg.\par -Skin is intact, no swelling, no ecchymosis\par -Incision is well-healed, no erythema, no signs of infection\par -Range of motion knee 0 to 90 degrees\par -Sensation is intact L1-S1\par -5/5 EHL, FHL, TA, GS\par -Foot is warm and well-perfused, palpable dorsalis pedis pulse\par  [de-identified] : Xrays of the left knee were taken in the office today, 3/8/23.  AP, oblique, lateral.  X-rays show good overall alignment of the knee.  The patella is in excellent position.  No displacement of the implants.  No lucencies around the screws. [de-identified] : 67-year-old woman s/p ORIF left patella, approximately 6.5 weeks out. [de-identified] : -Weightbearing as tolerated left LE\par -No restriction on range of motion\par -Discontinue use of [place]\par -Continue Physical therapy: to improve ROM and strengthening\par -Follow-up in 2 months with x-rays of the left knee at that time\par -All the patient's questions and concerns were addressed during this visit\par

## 2023-05-24 ENCOUNTER — APPOINTMENT (OUTPATIENT)
Dept: ORTHOPEDIC SURGERY | Facility: CLINIC | Age: 68
End: 2023-05-24
Payer: COMMERCIAL

## 2023-05-24 PROCEDURE — 99213 OFFICE O/P EST LOW 20 MIN: CPT

## 2023-05-24 PROCEDURE — 73562 X-RAY EXAM OF KNEE 3: CPT | Mod: LT

## 2023-05-24 NOTE — DISCUSSION/SUMMARY
[de-identified] : 68-year-old woman s/p ORIF left patella, approximately 4 months out.\par -X-ray and physical exam findings were discussed with the patient\par -Weightbearing as tolerated left LE\par -Continue Physical therapy: to improve ROM and strengthening\par -Follow-up in 4 months with x-rays of the left knee at that time\par -All the patient's questions and concerns were addressed during this visit\par

## 2023-05-24 NOTE — HISTORY OF PRESENT ILLNESS
[de-identified] : Mrs. SEVERIN, DONNA is a 68 y.o. woman who presents to the office for post op s/p ORIF left patella on 1/23/23. Since her last visit she states she is feeling better. She completed PT and has been doing home exercises to work on ROM. She does not require the use of pain medication.

## 2023-05-24 NOTE — PHYSICAL EXAM
[de-identified] : The patient is sitting comfortably in the exam room. \par LEFT leg.\par -Skin is intact, no swelling, no ecchymosis\par -Incision is well-healed, no erythema, no signs of infection\par -Range of motion knee 0 to 120 degrees\par -Sensation is intact L1-S1\par -5/5 EHL, FHL, TA, GS\par -Foot is warm and well-perfused, palpable dorsalis pedis pulse\par  [de-identified] : Xrays of the left knee were taken in the office today, 5/24/23.  AP, oblique, lateral.  X-rays show good overall alignment of the knee.  The patella is in excellent position.  No displacement of the implants.  No lucencies around the screws.

## 2023-10-18 ENCOUNTER — APPOINTMENT (OUTPATIENT)
Dept: ORTHOPEDIC SURGERY | Facility: CLINIC | Age: 68
End: 2023-10-18
Payer: COMMERCIAL

## 2023-10-18 VITALS — BODY MASS INDEX: 29.23 KG/M2 | WEIGHT: 165 LBS | HEIGHT: 63 IN

## 2023-10-18 DIAGNOSIS — S82.002A UNSPECIFIED FRACTURE OF LEFT PATELLA, INITIAL ENCOUNTER FOR CLOSED FRACTURE: ICD-10-CM

## 2023-10-18 PROCEDURE — 73562 X-RAY EXAM OF KNEE 3: CPT | Mod: LT

## 2023-10-18 PROCEDURE — 99213 OFFICE O/P EST LOW 20 MIN: CPT

## 2024-06-11 NOTE — ASU PATIENT PROFILE, ADULT - ACCEPTABLE
Patient notified, she will call insurance to find out who is in net work then let me know so I can send order   0

## (undated) DEVICE — DRILL BIT STRYKER ORTHO TRAUMA AO SCALED 2X105MM

## (undated) DEVICE — BLADE SCALPEL SAFETYLOCK #10

## (undated) DEVICE — MEDICATION LABELS W MARKER

## (undated) DEVICE — GLV 7 PROTEXIS (WHITE)

## (undated) DEVICE — GLV 7.5 PROTEXIS (WHITE)

## (undated) DEVICE — DRILL BIT STRYKER ORTHO 2.7MM

## (undated) DEVICE — DRAPE TOWEL BLUE 17" X 24"

## (undated) DEVICE — POSITIONER CARDIAC BUMP

## (undated) DEVICE — MARKING PEN W RULER

## (undated) DEVICE — DRAPE C ARM C-ARMOUR

## (undated) DEVICE — SOL IRR POUR H2O 250ML

## (undated) DEVICE — SUT ETHIBOND 2 30" LR

## (undated) DEVICE — SUT POLYSORB 2-0 30" GS-21 UNDYED

## (undated) DEVICE — LAP PAD 18 X 18"

## (undated) DEVICE — DRSG WEBRIL 6"

## (undated) DEVICE — SUT POLYSORB 0 30" GS-21 UNDYED

## (undated) DEVICE — SUT FIBERWIRE #2 38" STRAND 1 BLUE T-5 TAPER

## (undated) DEVICE — SOL IRR POUR NS 0.9% 500ML

## (undated) DEVICE — DRAPE C ARM UNIVERSAL

## (undated) DEVICE — POSITIONER FOAM EGG CRATE ULNAR 2PCS (PINK)

## (undated) DEVICE — SPLINT IMMOBILIZER 3-PANEL KNEE 20"

## (undated) DEVICE — DRSG STERISTRIPS 0.5 X 4"

## (undated) DEVICE — SUT RETRIEVER S&N LAVENDER

## (undated) DEVICE — GLV 6.5 PROTEXIS (WHITE)

## (undated) DEVICE — DRAPE MAYO STAND 30"

## (undated) DEVICE — GLV 8.5 PROTEXIS (WHITE)

## (undated) DEVICE — SPECIMEN CONTAINER 100ML

## (undated) DEVICE — GOWN TRIMAX LG

## (undated) DEVICE — PACK EXTREMITY

## (undated) DEVICE — DRSG AQUACEL 3.5 X 10"

## (undated) DEVICE — DRSG KLING 6"

## (undated) DEVICE — DRSG STOCKINETTE IMPERVIOUS LG

## (undated) DEVICE — FOLEY TRAY 16FR 5CC LTX UMETER CLOSED

## (undated) DEVICE — SUT POLYSORB 3-0 30" P-12 UNDYED

## (undated) DEVICE — DRAPE IOBAN 23" X 23"

## (undated) DEVICE — SUT BIOSYN 3-0 27" P-12

## (undated) DEVICE — VENODYNE/SCD SLEEVE CALF LARGE

## (undated) DEVICE — DRSG DERMABOND 0.7ML

## (undated) DEVICE — GLV 8 PROTEXIS (WHITE)

## (undated) DEVICE — STAPLER SKIN VISI-STAT 35 WIDE

## (undated) DEVICE — Device

## (undated) DEVICE — WARMING BLANKET UPPER ADULT

## (undated) DEVICE — SUT ETHIBOND 5 30" LR

## (undated) DEVICE — SAW BLADE MICROAIRE SAGITTAL 9.4MMX25.4MMX0.6MM